# Patient Record
Sex: FEMALE | URBAN - METROPOLITAN AREA
[De-identification: names, ages, dates, MRNs, and addresses within clinical notes are randomized per-mention and may not be internally consistent; named-entity substitution may affect disease eponyms.]

---

## 2021-11-24 ENCOUNTER — OFFICE VISIT (OUTPATIENT)
Dept: URBAN - METROPOLITAN AREA CLINIC 25 | Facility: CLINIC | Age: 71
End: 2021-11-24
Payer: MEDICARE

## 2021-11-24 ENCOUNTER — LAB OUTSIDE AN ENCOUNTER (OUTPATIENT)
Dept: URBAN - METROPOLITAN AREA CLINIC 25 | Facility: CLINIC | Age: 71
End: 2021-11-24

## 2021-11-24 ENCOUNTER — DASHBOARD ENCOUNTERS (OUTPATIENT)
Age: 71
End: 2021-11-24

## 2021-11-24 VITALS
TEMPERATURE: 97.1 F | SYSTOLIC BLOOD PRESSURE: 166 MMHG | HEIGHT: 64 IN | DIASTOLIC BLOOD PRESSURE: 90 MMHG | HEART RATE: 103 BPM | BODY MASS INDEX: 25.81 KG/M2 | WEIGHT: 151.2 LBS

## 2021-11-24 DIAGNOSIS — E11.69 TYPE 2 DIABETES MELLITUS WITH OTHER SPECIFIED COMPLICATION: ICD-10-CM

## 2021-11-24 DIAGNOSIS — Z12.11 COLON CANCER SCREENING: ICD-10-CM

## 2021-11-24 PROBLEM — 12811000119100 COMPLICATION DUE TO DIABETES MELLITUS TYPE 2: Status: ACTIVE | Noted: 2021-11-24

## 2021-11-24 PROCEDURE — 99202 OFFICE O/P NEW SF 15 MIN: CPT | Performed by: INTERNAL MEDICINE

## 2021-11-24 RX ORDER — GLUCOSAMINE SULFATE 500 MG
1 CAPSULE WITH A MEAL CAPSULE ORAL THREE TIMES A DAY
Status: ACTIVE | COMMUNITY

## 2021-11-24 RX ORDER — TURMERIC 100 %
AS DIRECTED POWDER (GRAM) MISCELLANEOUS
Status: ACTIVE | COMMUNITY

## 2021-11-24 RX ORDER — SODIUM, POTASSIUM,MAG SULFATES 17.5-3.13G
1 KIT SOLUTION, RECONSTITUTED, ORAL ORAL
Qty: 1 KIT (354ML) | Refills: 0 | OUTPATIENT
Start: 2021-11-24 | End: 2021-11-25

## 2021-11-24 RX ORDER — LORATADINE 10 MG
1 TABLET TABLET ORAL ONCE A DAY
Status: ACTIVE | COMMUNITY

## 2021-11-24 RX ORDER — METFORMIN HYDROCHLORIDE 1000 MG/1
1 TABLET WITH A MEAL TABLET, FILM COATED ORAL ONCE A DAY
Status: ACTIVE | COMMUNITY

## 2021-11-24 RX ORDER — IRON FUM,PS CMP/VIT C/NIACIN 125-40-3MG
1 CAPSULE BETWEEN MEALS CAPSULE ORAL ONCE A DAY
Status: ACTIVE | COMMUNITY

## 2021-11-24 RX ORDER — LOVASTATIN 10 MG/1
1 TABLET WITH THE EVENING MEAL TABLET ORAL ONCE A DAY
Status: ACTIVE | COMMUNITY

## 2021-11-24 NOTE — PHYSICAL EXAM CARDIOVASCULAR:
Patient resting quietly in no acute distress, respirations even and unlabored, 
IV fluids infusing without difficulty, no redness or swelling noted at site. No 
adverse reaction noted to medication. no edema, no murmurs, regular rate and rhythm

## 2021-12-16 ENCOUNTER — OFFICE VISIT (OUTPATIENT)
Dept: URBAN - METROPOLITAN AREA SURGERY CENTER 20 | Facility: SURGERY CENTER | Age: 71
End: 2021-12-16
Payer: MEDICARE

## 2021-12-16 DIAGNOSIS — D12.0 ADENOMA OF CECUM: ICD-10-CM

## 2021-12-16 DIAGNOSIS — Z12.11 AVERAGE RISK FOR CRC. DUE TO PT'S CO-MORBID STATE WITH END STAGE DEMENTIA, HIGH RISK FOR ANESTHESIA (PER NEUROLOGY); INABILITY TO TAKE A BOWEL PREP....WOULD NOT ADVISE ANY COLORECTAL CANCER SCREENING INCLUDING STOOL TEST FOR FECAL BLOOD.: ICD-10-CM

## 2021-12-16 DIAGNOSIS — K62.1 ANAL AND RECTAL POLYP: ICD-10-CM

## 2021-12-16 PROCEDURE — 45380 COLONOSCOPY AND BIOPSY: CPT | Performed by: INTERNAL MEDICINE

## 2021-12-16 PROCEDURE — G8907 PT DOC NO EVENTS ON DISCHARG: HCPCS | Performed by: INTERNAL MEDICINE

## 2021-12-16 RX ORDER — IRON FUM,PS CMP/VIT C/NIACIN 125-40-3MG
1 CAPSULE BETWEEN MEALS CAPSULE ORAL ONCE A DAY
Status: ACTIVE | COMMUNITY

## 2021-12-16 RX ORDER — METFORMIN HYDROCHLORIDE 1000 MG/1
1 TABLET WITH A MEAL TABLET, FILM COATED ORAL ONCE A DAY
Status: ACTIVE | COMMUNITY

## 2021-12-16 RX ORDER — TURMERIC 100 %
AS DIRECTED POWDER (GRAM) MISCELLANEOUS
Status: ACTIVE | COMMUNITY

## 2021-12-16 RX ORDER — LOVASTATIN 10 MG/1
1 TABLET WITH THE EVENING MEAL TABLET ORAL ONCE A DAY
Status: ACTIVE | COMMUNITY

## 2021-12-16 RX ORDER — LORATADINE 10 MG
1 TABLET TABLET ORAL ONCE A DAY
Status: ACTIVE | COMMUNITY

## 2021-12-16 RX ORDER — GLUCOSAMINE SULFATE 500 MG
1 CAPSULE WITH A MEAL CAPSULE ORAL THREE TIMES A DAY
Status: ACTIVE | COMMUNITY

## 2021-12-30 ENCOUNTER — TELEPHONE ENCOUNTER (OUTPATIENT)
Dept: URBAN - METROPOLITAN AREA CLINIC 92 | Facility: CLINIC | Age: 71
End: 2021-12-30

## 2022-04-30 ENCOUNTER — TELEPHONE ENCOUNTER (OUTPATIENT)
Dept: URBAN - METROPOLITAN AREA CLINIC 121 | Facility: CLINIC | Age: 72
End: 2022-04-30

## 2022-04-30 RX ORDER — VALSARTAN/HYDROCHLOROTHIAZIDE 160-12.5MG
TABLET ORAL
OUTPATIENT
Start: 2009-04-27

## 2022-04-30 RX ORDER — DEXLANSOPRAZOLE 60 MG/1
1 PO QD CAPSULE, DELAYED RELEASE ORAL
OUTPATIENT
Start: 2012-04-26 | End: 2012-11-21

## 2022-04-30 RX ORDER — FERROUS FUMARATE/FOLIC ACID 106 MG-1MG
TABLET ORAL
OUTPATIENT
Start: 2009-04-27 | End: 2012-05-30

## 2022-04-30 RX ORDER — ASPIRIN 81 MG
TABLET, DELAYED RELEASE (ENTERIC COATED) ORAL
OUTPATIENT
Start: 2009-04-27

## 2022-04-30 RX ORDER — OMEPRAZOLE 40 MG/1
TAKE 1 CAPSULE 1HR BEFORE BREAKFAST CAPSULE, DELAYED RELEASE ORAL
OUTPATIENT
Start: 2012-11-26

## 2022-04-30 RX ORDER — OMEPRAZOLE 40 MG/1
TAKE 1 CAPSULE BY MOUTH EVERYDAY CAPSULE, DELAYED RELEASE ORAL
OUTPATIENT
Start: 2013-03-25 | End: 2013-11-11

## 2022-04-30 RX ORDER — OMEPRAZOLE 40 MG/1
TAKE 1 CAPSULE PO QD CAPSULE, DELAYED RELEASE ORAL
OUTPATIENT
Start: 2013-07-19 | End: 2013-11-11

## 2022-04-30 RX ORDER — LOVASTATIN 20 MG/1
TABLET ORAL
OUTPATIENT
Start: 2009-04-27

## 2022-04-30 RX ORDER — PANTOPRAZOLE SODIUM 40 MG/1
TABLET, DELAYED RELEASE ORAL
OUTPATIENT
Start: 2009-04-27 | End: 2012-05-30

## 2022-04-30 RX ORDER — PIOGLITAZONE HCL 30 MG
TABLET ORAL
OUTPATIENT
Start: 2009-04-27 | End: 2012-05-30

## 2022-04-30 RX ORDER — METFORMIN HCL 1000 MG/1
TABLET ORAL
OUTPATIENT
Start: 2009-04-27

## 2022-04-30 RX ORDER — FERROUS FUMARATE/FOLIC ACID 106 MG-1MG
TABLET ORAL
OUTPATIENT
Start: 2009-04-27

## 2022-04-30 RX ORDER — ASPIRIN 81 MG
TABLET, DELAYED RELEASE (ENTERIC COATED) ORAL
OUTPATIENT
Start: 2009-04-27 | End: 2012-05-30

## 2022-04-30 RX ORDER — OMEPRAZOLE 40 MG/1
TAKE 1 CAPSULE PO QD CAPSULE, DELAYED RELEASE ORAL
OUTPATIENT
Start: 2013-07-19

## 2022-04-30 RX ORDER — DEXLANSOPRAZOLE 60 MG/1
1 PO QD CAPSULE, DELAYED RELEASE ORAL
OUTPATIENT
Start: 2012-04-26

## 2022-04-30 RX ORDER — PANTOPRAZOLE SODIUM 40 MG/1
TABLET, DELAYED RELEASE ORAL
OUTPATIENT
Start: 2009-04-27

## 2022-04-30 RX ORDER — OMEPRAZOLE 40 MG/1
TAKE 1 CAPSULE BY MOUTH EVERYDAY CAPSULE, DELAYED RELEASE ORAL
OUTPATIENT
Start: 2013-03-25

## 2022-04-30 RX ORDER — OMEPRAZOLE 40 MG/1
TAKE 1 CAPSULE 1HR BEFORE BREAKFAST CAPSULE, DELAYED RELEASE ORAL
OUTPATIENT
Start: 2012-11-26 | End: 2013-11-11

## 2022-04-30 RX ORDER — PIOGLITAZONE HCL 30 MG
TABLET ORAL
OUTPATIENT
Start: 2009-04-27

## 2022-04-30 RX ORDER — VALSARTAN/HYDROCHLOROTHIAZIDE 160-12.5MG
TABLET ORAL
OUTPATIENT
Start: 2009-04-27 | End: 2012-05-30

## 2022-05-01 ENCOUNTER — TELEPHONE ENCOUNTER (OUTPATIENT)
Dept: URBAN - METROPOLITAN AREA CLINIC 121 | Facility: CLINIC | Age: 72
End: 2022-05-01

## 2022-05-01 RX ORDER — LOSARTAN POTASSIUM 100 MG/1
QD TABLET, FILM COATED ORAL
Status: ACTIVE | COMMUNITY
Start: 2012-05-30

## 2022-05-01 RX ORDER — POLYETHYLENE GLYCOL 3350, SODIUM SULFATE, SODIUM CHLORIDE, POTASSIUM CHLORIDE, ASCORBIC ACID, SODIUM ASCORBATE 7.5-2.691G
MIX AND USE AS DIRECTED KIT ORAL
Status: ACTIVE | COMMUNITY
Start: 2013-10-24

## 2022-05-01 RX ORDER — AMLODIPINE BESYLATE 5 MG/1
QD TABLET ORAL
Status: ACTIVE | COMMUNITY
Start: 2012-05-30

## 2022-05-01 RX ORDER — PSEUDOEPHEDRINE HCL 30 MG
2 QD TABLET ORAL
Status: ACTIVE | COMMUNITY
Start: 2012-11-26

## 2022-05-01 RX ORDER — OMEPRAZOLE 40 MG/1
1 CAPSULE PO BID X 14 DAYS CAPSULE, DELAYED RELEASE ORAL
Status: ACTIVE | COMMUNITY
Start: 2013-11-25

## 2022-05-01 RX ORDER — FERROUS FUMARATE/FOLIC ACID 106 MG-1MG
QD TABLET ORAL
Status: ACTIVE | COMMUNITY
Start: 2012-11-26

## 2022-05-01 RX ORDER — LOVASTATIN 20 MG/1
QD TABLET ORAL
Status: ACTIVE | COMMUNITY
Start: 2012-05-30

## 2022-05-01 RX ORDER — GLUCOSAMINE SULFATE 500 MG
QD CAPSULE ORAL
Status: ACTIVE | COMMUNITY
Start: 2012-11-26

## 2022-05-01 RX ORDER — KRILL/OM-3/DHA/EPA/PHOSPHO/AST 1000-230MG
QD CAPSULE ORAL
Status: ACTIVE | COMMUNITY
Start: 2012-05-30

## 2022-05-01 RX ORDER — METFORMIN HCL 1000 MG/1
BID TABLET ORAL
Status: ACTIVE | COMMUNITY
Start: 2012-05-30

## 2022-05-01 RX ORDER — DIMENHYDRINATE 50 MG
TABLET ORAL
Status: ACTIVE | COMMUNITY
Start: 2012-11-26

## 2022-05-01 RX ORDER — AMLODIPINE BESYLATE 5 MG/1
QD TABLET ORAL
Status: ACTIVE | COMMUNITY
Start: 2012-11-26

## 2022-05-01 RX ORDER — AMOXICILLIN 500 MG/1
2 TABLETS PO BID X 14DAYS TABLET, FILM COATED ORAL
Status: ACTIVE | COMMUNITY
Start: 2013-11-25

## 2022-05-01 RX ORDER — CLARITHROMYCIN 500 MG/1
1 TABLET PO BID X 14 DAYS TABLET ORAL
Status: ACTIVE | COMMUNITY
Start: 2013-11-25

## 2022-05-01 RX ORDER — DEXLANSOPRAZOLE 60 MG/1
1 CAPSULE PO QAM CAPSULE, DELAYED RELEASE ORAL
Status: ACTIVE | COMMUNITY
Start: 2013-10-24

## 2024-02-21 ENCOUNTER — OV NP (OUTPATIENT)
Dept: URBAN - METROPOLITAN AREA CLINIC 27 | Facility: CLINIC | Age: 74
End: 2024-02-21
Payer: COMMERCIAL

## 2024-02-21 VITALS
HEIGHT: 64 IN | SYSTOLIC BLOOD PRESSURE: 182 MMHG | DIASTOLIC BLOOD PRESSURE: 104 MMHG | BODY MASS INDEX: 25.61 KG/M2 | HEART RATE: 107 BPM | WEIGHT: 150 LBS

## 2024-02-21 DIAGNOSIS — R31.29 MICROSCOPIC HEMATURIA: ICD-10-CM

## 2024-02-21 PROBLEM — 197940006: Status: ACTIVE | Noted: 2024-02-21

## 2024-02-21 PROCEDURE — 99203 OFFICE O/P NEW LOW 30 MIN: CPT | Performed by: INTERNAL MEDICINE

## 2024-02-21 RX ORDER — TURMERIC 100 %
AS DIRECTED POWDER (GRAM) MISCELLANEOUS
Status: ACTIVE | COMMUNITY

## 2024-02-21 RX ORDER — LOVASTATIN 20 MG/1
QD TABLET ORAL
Status: ACTIVE | COMMUNITY
Start: 2012-05-30

## 2024-02-21 RX ORDER — FERROUS FUMARATE/FOLIC ACID 106 MG-1MG
QD TABLET ORAL
Status: ACTIVE | COMMUNITY
Start: 2012-11-26

## 2024-02-21 RX ORDER — KRILL/OM-3/DHA/EPA/PHOSPHO/AST 1000-230MG
QD CAPSULE ORAL
Status: ACTIVE | COMMUNITY
Start: 2012-05-30

## 2024-02-21 RX ORDER — PSEUDOEPHEDRINE HCL 30 MG
2 QD TABLET ORAL
Status: ACTIVE | COMMUNITY
Start: 2012-11-26

## 2024-02-21 RX ORDER — GLUCOSAMINE SULFATE 500 MG
QD CAPSULE ORAL
Status: ACTIVE | COMMUNITY
Start: 2012-11-26

## 2024-02-21 RX ORDER — DIMENHYDRINATE 50 MG
TABLET ORAL
Status: ACTIVE | COMMUNITY
Start: 2012-11-26

## 2024-02-21 RX ORDER — LOSARTAN POTASSIUM 100 MG/1
QD TABLET, FILM COATED ORAL
Status: ACTIVE | COMMUNITY
Start: 2012-05-30

## 2024-02-21 RX ORDER — IRON FUM,PS CMP/VIT C/NIACIN 125-40-3MG
1 CAPSULE BETWEEN MEALS CAPSULE ORAL ONCE A DAY
Status: ACTIVE | COMMUNITY

## 2024-02-21 RX ORDER — AMLODIPINE BESYLATE 5 MG/1
QD TABLET ORAL
Status: ACTIVE | COMMUNITY
Start: 2012-11-26

## 2024-02-21 RX ORDER — LOVASTATIN 10 MG/1
1 TABLET WITH THE EVENING MEAL TABLET ORAL ONCE A DAY
Status: ACTIVE | COMMUNITY

## 2024-02-21 RX ORDER — AMOXICILLIN 500 MG/1
2 TABLETS PO BID X 14DAYS TABLET, FILM COATED ORAL
Status: ACTIVE | COMMUNITY
Start: 2013-11-25

## 2024-02-21 RX ORDER — OMEPRAZOLE 40 MG/1
1 CAPSULE PO BID X 14 DAYS CAPSULE, DELAYED RELEASE ORAL
Status: ACTIVE | COMMUNITY
Start: 2013-11-25

## 2024-02-21 RX ORDER — POLYETHYLENE GLYCOL 3350, SODIUM SULFATE, SODIUM CHLORIDE, POTASSIUM CHLORIDE, ASCORBIC ACID, SODIUM ASCORBATE 7.5-2.691G
MIX AND USE AS DIRECTED KIT ORAL
Status: ACTIVE | COMMUNITY
Start: 2013-10-24

## 2024-02-21 RX ORDER — METFORMIN HCL 1000 MG/1
BID TABLET ORAL
Status: ACTIVE | COMMUNITY
Start: 2012-05-30

## 2024-02-21 RX ORDER — METFORMIN HYDROCHLORIDE 1000 MG/1
1 TABLET WITH A MEAL TABLET, FILM COATED ORAL ONCE A DAY
Status: ACTIVE | COMMUNITY

## 2024-02-21 RX ORDER — CLARITHROMYCIN 500 MG/1
1 TABLET PO BID X 14 DAYS TABLET ORAL
Status: ACTIVE | COMMUNITY
Start: 2013-11-25

## 2024-02-21 RX ORDER — LORATADINE 10 MG
1 TABLET TABLET ORAL ONCE A DAY
Status: ACTIVE | COMMUNITY

## 2024-02-21 RX ORDER — DEXLANSOPRAZOLE 60 MG/1
1 CAPSULE PO QAM CAPSULE, DELAYED RELEASE ORAL
Status: ACTIVE | COMMUNITY
Start: 2013-10-24

## 2024-02-21 RX ORDER — GLUCOSAMINE SULFATE 500 MG
1 CAPSULE WITH A MEAL CAPSULE ORAL THREE TIMES A DAY
Status: ACTIVE | COMMUNITY

## 2024-02-21 NOTE — HPI-TODAY'S VISIT:
Mrs. Dickens is a 74-year-old -American female who presents today per referral by her primary care physician and oncologist for evaluation of hematuria.  I specifically queried the patient whether she was referred for blood in stool and she denies.  She denies seeing any blood in her stool.  Specifically, she was told she had blood in her urine.  She actually has not seen the blood in her urine. She had a colonoscopy in 2021 which was notable for polyps with Dr. Rascon.  Otherwise, she has no other GI complaints.  Her history is notable for chronic anemia, hypertension and diabetes mellitus.

## 2024-03-04 ENCOUNTER — TELEP (OUTPATIENT)
Dept: URBAN - METROPOLITAN AREA TELEHEALTH 2 | Facility: TELEHEALTH | Age: 74
End: 2024-03-04
Payer: COMMERCIAL

## 2024-03-04 DIAGNOSIS — R10.32 LEFT LOWER QUADRANT PAIN: ICD-10-CM

## 2024-03-04 PROCEDURE — 99443 PHONE E/M BY PHYS 21-30 MIN: CPT | Performed by: INTERNAL MEDICINE

## 2024-03-04 RX ORDER — LORATADINE 10 MG
1 TABLET TABLET ORAL ONCE A DAY
Status: ACTIVE | COMMUNITY

## 2024-03-04 RX ORDER — TURMERIC 100 %
AS DIRECTED POWDER (GRAM) MISCELLANEOUS
Status: ACTIVE | COMMUNITY

## 2024-03-04 RX ORDER — FERROUS FUMARATE/FOLIC ACID 106 MG-1MG
QD TABLET ORAL
Status: ACTIVE | COMMUNITY
Start: 2012-11-26

## 2024-03-04 RX ORDER — LOSARTAN POTASSIUM 100 MG/1
QD TABLET, FILM COATED ORAL
Status: ACTIVE | COMMUNITY
Start: 2012-05-30

## 2024-03-04 RX ORDER — POLYETHYLENE GLYCOL 3350, SODIUM SULFATE, SODIUM CHLORIDE, POTASSIUM CHLORIDE, ASCORBIC ACID, SODIUM ASCORBATE 7.5-2.691G
MIX AND USE AS DIRECTED KIT ORAL
Status: ACTIVE | COMMUNITY
Start: 2013-10-24

## 2024-03-04 RX ORDER — DICYCLOMINE HYDROCHLORIDE 10 MG/1
1 CAPSULE 30 MINUTES BEFORE EATING CAPSULE ORAL THREE TIMES A DAY
Qty: 90 | Refills: 3 | OUTPATIENT
Start: 2024-03-06 | End: 2024-07-04

## 2024-03-04 RX ORDER — DIMENHYDRINATE 50 MG
TABLET ORAL
Status: ACTIVE | COMMUNITY
Start: 2012-11-26

## 2024-03-04 RX ORDER — OMEPRAZOLE 40 MG/1
1 CAPSULE PO BID X 14 DAYS CAPSULE, DELAYED RELEASE ORAL
Status: ACTIVE | COMMUNITY
Start: 2013-11-25

## 2024-03-04 RX ORDER — KRILL/OM-3/DHA/EPA/PHOSPHO/AST 1000-230MG
QD CAPSULE ORAL
Status: ACTIVE | COMMUNITY
Start: 2012-05-30

## 2024-03-04 RX ORDER — GLUCOSAMINE SULFATE 500 MG
QD CAPSULE ORAL
Status: ACTIVE | COMMUNITY
Start: 2012-11-26

## 2024-03-04 RX ORDER — AMLODIPINE BESYLATE 5 MG/1
QD TABLET ORAL
Status: ACTIVE | COMMUNITY
Start: 2012-11-26

## 2024-03-04 RX ORDER — METFORMIN HCL 1000 MG/1
BID TABLET ORAL
Status: ACTIVE | COMMUNITY
Start: 2012-05-30

## 2024-03-04 RX ORDER — DEXLANSOPRAZOLE 60 MG/1
1 CAPSULE PO QAM CAPSULE, DELAYED RELEASE ORAL
Status: ACTIVE | COMMUNITY
Start: 2013-10-24

## 2024-03-04 RX ORDER — LOVASTATIN 10 MG/1
1 TABLET WITH THE EVENING MEAL TABLET ORAL ONCE A DAY
Status: ACTIVE | COMMUNITY

## 2024-03-04 RX ORDER — GLUCOSAMINE SULFATE 500 MG
1 CAPSULE WITH A MEAL CAPSULE ORAL THREE TIMES A DAY
Status: ACTIVE | COMMUNITY

## 2024-03-04 RX ORDER — AMOXICILLIN 500 MG/1
2 TABLETS PO BID X 14DAYS TABLET, FILM COATED ORAL
Status: ACTIVE | COMMUNITY
Start: 2013-11-25

## 2024-03-04 RX ORDER — METFORMIN HYDROCHLORIDE 1000 MG/1
1 TABLET WITH A MEAL TABLET, FILM COATED ORAL ONCE A DAY
Status: ACTIVE | COMMUNITY

## 2024-03-04 RX ORDER — LOVASTATIN 20 MG/1
QD TABLET ORAL
Status: ACTIVE | COMMUNITY
Start: 2012-05-30

## 2024-03-04 RX ORDER — PSEUDOEPHEDRINE HCL 30 MG
2 QD TABLET ORAL
Status: ACTIVE | COMMUNITY
Start: 2012-11-26

## 2024-03-04 RX ORDER — IRON FUM,PS CMP/VIT C/NIACIN 125-40-3MG
1 CAPSULE BETWEEN MEALS CAPSULE ORAL ONCE A DAY
Status: ACTIVE | COMMUNITY

## 2024-03-04 RX ORDER — CLARITHROMYCIN 500 MG/1
1 TABLET PO BID X 14 DAYS TABLET ORAL
Status: ACTIVE | COMMUNITY
Start: 2013-11-25

## 2024-03-04 NOTE — HPI-TODAY'S VISIT:
Mrs. Dickens calls in today with complaint of left lower quadrant discomfort that she has been experiencing intermittently for several months.  She also reports some mild constipation.  Her last colonoscopy in December 2021 was notable for a couple polyps and internal hemorrhoids.  Otherwise, she has no other GI complaints.

## 2024-03-06 ENCOUNTER — LAB (OUTPATIENT)
Dept: URBAN - METROPOLITAN AREA TELEHEALTH 2 | Facility: TELEHEALTH | Age: 74
End: 2024-03-06

## 2024-03-06 PROBLEM — 301716002: Status: ACTIVE | Noted: 2024-03-06

## 2024-04-11 ENCOUNTER — TELEP (OUTPATIENT)
Dept: URBAN - METROPOLITAN AREA CLINIC 29 | Facility: CLINIC | Age: 74
End: 2024-04-11
Payer: COMMERCIAL

## 2024-04-11 DIAGNOSIS — R10.32 LEFT LOWER QUADRANT PAIN: ICD-10-CM

## 2024-04-11 PROCEDURE — 99443 PHONE E/M BY PHYS 21-30 MIN: CPT | Performed by: INTERNAL MEDICINE

## 2024-04-11 RX ORDER — POLYETHYLENE GLYCOL 3350, SODIUM SULFATE, SODIUM CHLORIDE, POTASSIUM CHLORIDE, ASCORBIC ACID, SODIUM ASCORBATE 7.5-2.691G
MIX AND USE AS DIRECTED KIT ORAL
Status: ACTIVE | COMMUNITY
Start: 2013-10-24

## 2024-04-11 RX ORDER — LOVASTATIN 20 MG/1
QD TABLET ORAL
Status: ACTIVE | COMMUNITY
Start: 2012-05-30

## 2024-04-11 RX ORDER — AMLODIPINE BESYLATE 5 MG/1
QD TABLET ORAL
Status: ACTIVE | COMMUNITY
Start: 2012-11-26

## 2024-04-11 RX ORDER — LOSARTAN POTASSIUM 100 MG/1
QD TABLET, FILM COATED ORAL
Status: ACTIVE | COMMUNITY
Start: 2012-05-30

## 2024-04-11 RX ORDER — KRILL/OM-3/DHA/EPA/PHOSPHO/AST 1000-230MG
QD CAPSULE ORAL
Status: ACTIVE | COMMUNITY
Start: 2012-05-30

## 2024-04-11 RX ORDER — METFORMIN HCL 1000 MG/1
BID TABLET ORAL
Status: ACTIVE | COMMUNITY
Start: 2012-05-30

## 2024-04-11 RX ORDER — GLUCOSAMINE SULFATE 500 MG
QD CAPSULE ORAL
Status: ACTIVE | COMMUNITY
Start: 2012-11-26

## 2024-04-11 RX ORDER — FERROUS FUMARATE/FOLIC ACID 106 MG-1MG
QD TABLET ORAL
Status: ACTIVE | COMMUNITY
Start: 2012-11-26

## 2024-04-11 RX ORDER — IRON FUM,PS CMP/VIT C/NIACIN 125-40-3MG
1 CAPSULE BETWEEN MEALS CAPSULE ORAL ONCE A DAY
Status: ACTIVE | COMMUNITY

## 2024-04-11 RX ORDER — OMEPRAZOLE 40 MG/1
1 CAPSULE PO BID X 14 DAYS CAPSULE, DELAYED RELEASE ORAL
Status: ACTIVE | COMMUNITY
Start: 2013-11-25

## 2024-04-11 RX ORDER — CLARITHROMYCIN 500 MG/1
1 TABLET PO BID X 14 DAYS TABLET ORAL
Status: ACTIVE | COMMUNITY
Start: 2013-11-25

## 2024-04-11 RX ORDER — PSEUDOEPHEDRINE HCL 30 MG
2 QD TABLET ORAL
Status: ACTIVE | COMMUNITY
Start: 2012-11-26

## 2024-04-11 RX ORDER — AMOXICILLIN 500 MG/1
2 TABLETS PO BID X 14DAYS TABLET, FILM COATED ORAL
Status: ACTIVE | COMMUNITY
Start: 2013-11-25

## 2024-04-11 RX ORDER — METFORMIN HYDROCHLORIDE 1000 MG/1
1 TABLET WITH A MEAL TABLET, FILM COATED ORAL ONCE A DAY
Status: ACTIVE | COMMUNITY

## 2024-04-11 RX ORDER — DIMENHYDRINATE 50 MG
TABLET ORAL
Status: ACTIVE | COMMUNITY
Start: 2012-11-26

## 2024-04-11 RX ORDER — LOVASTATIN 10 MG/1
1 TABLET WITH THE EVENING MEAL TABLET ORAL ONCE A DAY
Status: ACTIVE | COMMUNITY

## 2024-04-11 RX ORDER — DEXLANSOPRAZOLE 60 MG/1
1 CAPSULE PO QAM CAPSULE, DELAYED RELEASE ORAL
Status: ACTIVE | COMMUNITY
Start: 2013-10-24

## 2024-04-11 RX ORDER — TURMERIC 100 %
AS DIRECTED POWDER (GRAM) MISCELLANEOUS
Status: ACTIVE | COMMUNITY

## 2024-04-11 RX ORDER — DICYCLOMINE HYDROCHLORIDE 10 MG/1
1 CAPSULE 30 MINUTES BEFORE EATING CAPSULE ORAL THREE TIMES A DAY
Qty: 90 | Refills: 3 | Status: ACTIVE | COMMUNITY
Start: 2024-03-06 | End: 2024-07-04

## 2024-04-11 RX ORDER — GLUCOSAMINE SULFATE 500 MG
1 CAPSULE WITH A MEAL CAPSULE ORAL THREE TIMES A DAY
Status: ACTIVE | COMMUNITY

## 2024-04-11 RX ORDER — LORATADINE 10 MG
1 TABLET TABLET ORAL ONCE A DAY
Status: ACTIVE | COMMUNITY

## 2024-04-11 NOTE — HPI-TODAY'S VISIT:
Mrs. Dickens calls and today for telephone visit follow-up of her abdominal pain.  During her last visit, I advised her to start taking a fiber supplement such as MiraLAX and I will also prescribe dicyclomine for her to take as directed.  I will also order a CT scan of the abdomen for further evaluation.  Today, the patient reports that she is still having intermittent diffuse abdominal discomfort.  She is unsure what the cause is.  She reports that she takes Metamucil every few days because taking it daily causes loose stools.  She stopped taking dicyclomine because she said that the medication did not make her feel well.  Otherwise, she has no other GI complaints.

## 2024-05-23 ENCOUNTER — OFFICE VISIT (OUTPATIENT)
Dept: URBAN - METROPOLITAN AREA TELEHEALTH 2 | Facility: TELEHEALTH | Age: 74
End: 2024-05-23
Payer: COMMERCIAL

## 2024-05-23 ENCOUNTER — LAB OUTSIDE AN ENCOUNTER (OUTPATIENT)
Dept: URBAN - METROPOLITAN AREA TELEHEALTH 2 | Facility: TELEHEALTH | Age: 74
End: 2024-05-23

## 2024-05-23 DIAGNOSIS — R10.32 LEFT LOWER QUADRANT PAIN: ICD-10-CM

## 2024-05-23 DIAGNOSIS — I10 ESSENTIAL (PRIMARY) HYPERTENSION: ICD-10-CM

## 2024-05-23 DIAGNOSIS — R13.19 ESOPHAGEAL DYSPHAGIA: ICD-10-CM

## 2024-05-23 DIAGNOSIS — K21.9 GASTRO-ESOPHAGEAL REFLUX DISEASE WITHOUT ESOPHAGITIS: ICD-10-CM

## 2024-05-23 PROBLEM — 313436004: Status: ACTIVE | Noted: 2024-05-23

## 2024-05-23 PROBLEM — 399153001: Status: ACTIVE | Noted: 2024-05-23

## 2024-05-23 PROBLEM — 40890009: Status: ACTIVE | Noted: 2024-05-23

## 2024-05-23 PROCEDURE — 99443 PHONE E/M BY PHYS 21-30 MIN: CPT | Performed by: INTERNAL MEDICINE

## 2024-05-23 RX ORDER — OMEPRAZOLE 40 MG/1
1 CAPSULE PO BID X 14 DAYS CAPSULE, DELAYED RELEASE ORAL
Status: ACTIVE | COMMUNITY
Start: 2013-11-25

## 2024-05-23 RX ORDER — GLUCOSAMINE SULFATE 500 MG
QD CAPSULE ORAL
Status: ACTIVE | COMMUNITY
Start: 2012-11-26

## 2024-05-23 RX ORDER — AMLODIPINE BESYLATE 5 MG/1
QD TABLET ORAL
Status: ACTIVE | COMMUNITY
Start: 2012-11-26

## 2024-05-23 RX ORDER — LOSARTAN POTASSIUM 100 MG/1
QD TABLET, FILM COATED ORAL
Status: ACTIVE | COMMUNITY
Start: 2012-05-30

## 2024-05-23 RX ORDER — IRON FUM,PS CMP/VIT C/NIACIN 125-40-3MG
1 CAPSULE BETWEEN MEALS CAPSULE ORAL ONCE A DAY
Status: ACTIVE | COMMUNITY

## 2024-05-23 RX ORDER — GLUCOSAMINE SULFATE 500 MG
1 CAPSULE WITH A MEAL CAPSULE ORAL THREE TIMES A DAY
Status: ACTIVE | COMMUNITY

## 2024-05-23 RX ORDER — DEXLANSOPRAZOLE 60 MG/1
1 CAPSULE PO QAM CAPSULE, DELAYED RELEASE ORAL
Status: ACTIVE | COMMUNITY
Start: 2013-10-24

## 2024-05-23 RX ORDER — KRILL/OM-3/DHA/EPA/PHOSPHO/AST 1000-230MG
QD CAPSULE ORAL
Status: ACTIVE | COMMUNITY
Start: 2012-05-30

## 2024-05-23 RX ORDER — LOVASTATIN 20 MG/1
QD TABLET ORAL
Status: ACTIVE | COMMUNITY
Start: 2012-05-30

## 2024-05-23 RX ORDER — DICYCLOMINE HYDROCHLORIDE 10 MG/1
1 CAPSULE 30 MINUTES BEFORE EATING CAPSULE ORAL THREE TIMES A DAY
Qty: 90 | Refills: 3 | Status: ACTIVE | COMMUNITY
Start: 2024-03-06 | End: 2024-07-04

## 2024-05-23 RX ORDER — PSEUDOEPHEDRINE HCL 30 MG
2 QD TABLET ORAL
Status: ACTIVE | COMMUNITY
Start: 2012-11-26

## 2024-05-23 RX ORDER — TURMERIC 100 %
AS DIRECTED POWDER (GRAM) MISCELLANEOUS
Status: ACTIVE | COMMUNITY

## 2024-05-23 RX ORDER — METFORMIN HYDROCHLORIDE 1000 MG/1
1 TABLET WITH A MEAL TABLET, FILM COATED ORAL ONCE A DAY
Status: ACTIVE | COMMUNITY

## 2024-05-23 RX ORDER — AMOXICILLIN 500 MG/1
2 TABLETS PO BID X 14DAYS TABLET, FILM COATED ORAL
Status: ACTIVE | COMMUNITY
Start: 2013-11-25

## 2024-05-23 RX ORDER — LOVASTATIN 10 MG/1
1 TABLET WITH THE EVENING MEAL TABLET ORAL ONCE A DAY
Status: ACTIVE | COMMUNITY

## 2024-05-23 RX ORDER — CLARITHROMYCIN 500 MG/1
1 TABLET PO BID X 14 DAYS TABLET ORAL
Status: ACTIVE | COMMUNITY
Start: 2013-11-25

## 2024-05-23 RX ORDER — FERROUS FUMARATE/FOLIC ACID 106 MG-1MG
QD TABLET ORAL
Status: ACTIVE | COMMUNITY
Start: 2012-11-26

## 2024-05-23 RX ORDER — LORATADINE 10 MG
1 TABLET TABLET ORAL ONCE A DAY
Status: ACTIVE | COMMUNITY

## 2024-05-23 RX ORDER — POLYETHYLENE GLYCOL 3350, SODIUM SULFATE, SODIUM CHLORIDE, POTASSIUM CHLORIDE, ASCORBIC ACID, SODIUM ASCORBATE 7.5-2.691G
MIX AND USE AS DIRECTED KIT ORAL
Status: ACTIVE | COMMUNITY
Start: 2013-10-24

## 2024-05-23 RX ORDER — PANTOPRAZOLE SODIUM 40 MG/1
1 TABLET TABLET, DELAYED RELEASE ORAL ONCE A DAY
Qty: 60 TABLET | Refills: 5 | OUTPATIENT
Start: 2024-05-23

## 2024-05-23 RX ORDER — DIMENHYDRINATE 50 MG
TABLET ORAL
Status: ACTIVE | COMMUNITY
Start: 2012-11-26

## 2024-05-23 RX ORDER — METFORMIN HCL 1000 MG/1
BID TABLET ORAL
Status: ACTIVE | COMMUNITY
Start: 2012-05-30

## 2024-05-29 ENCOUNTER — OFFICE VISIT (OUTPATIENT)
Dept: URBAN - METROPOLITAN AREA SURGERY CENTER 7 | Facility: SURGERY CENTER | Age: 74
End: 2024-05-29

## 2024-07-01 ENCOUNTER — OFFICE VISIT (OUTPATIENT)
Dept: URBAN - METROPOLITAN AREA CLINIC 27 | Facility: CLINIC | Age: 74
End: 2024-07-01

## 2024-08-23 ENCOUNTER — OFFICE VISIT (OUTPATIENT)
Dept: URBAN - METROPOLITAN AREA TELEHEALTH 2 | Facility: TELEHEALTH | Age: 74
End: 2024-08-23
Payer: COMMERCIAL

## 2024-08-23 DIAGNOSIS — R10.32 LEFT LOWER QUADRANT PAIN: ICD-10-CM

## 2024-08-23 PROCEDURE — 99443 PHONE E/M BY PHYS 21-30 MIN: CPT | Performed by: INTERNAL MEDICINE

## 2024-08-23 RX ORDER — TURMERIC 100 %
AS DIRECTED POWDER (GRAM) MISCELLANEOUS
Status: ACTIVE | COMMUNITY

## 2024-08-23 RX ORDER — AMLODIPINE BESYLATE 5 MG/1
QD TABLET ORAL
Status: ACTIVE | COMMUNITY
Start: 2012-11-26

## 2024-08-23 RX ORDER — GLUCOSAMINE SULFATE 500 MG
1 CAPSULE WITH A MEAL CAPSULE ORAL THREE TIMES A DAY
Status: ACTIVE | COMMUNITY

## 2024-08-23 RX ORDER — LOVASTATIN 20 MG/1
QD TABLET ORAL
Status: ACTIVE | COMMUNITY
Start: 2012-05-30

## 2024-08-23 RX ORDER — METFORMIN HCL 1000 MG/1
BID TABLET ORAL
Status: ACTIVE | COMMUNITY
Start: 2012-05-30

## 2024-08-23 RX ORDER — DEXLANSOPRAZOLE 60 MG/1
1 CAPSULE PO QAM CAPSULE, DELAYED RELEASE ORAL
Status: ACTIVE | COMMUNITY
Start: 2013-10-24

## 2024-08-23 RX ORDER — KRILL/OM-3/DHA/EPA/PHOSPHO/AST 1000-230MG
QD CAPSULE ORAL
Status: ACTIVE | COMMUNITY
Start: 2012-05-30

## 2024-08-23 RX ORDER — PANTOPRAZOLE SODIUM 40 MG/1
1 TABLET TABLET, DELAYED RELEASE ORAL ONCE A DAY
Qty: 60 TABLET | Refills: 5 | Status: ACTIVE | COMMUNITY
Start: 2024-05-23

## 2024-08-23 RX ORDER — LOVASTATIN 10 MG/1
1 TABLET WITH THE EVENING MEAL TABLET ORAL ONCE A DAY
Status: ACTIVE | COMMUNITY

## 2024-08-23 RX ORDER — PSEUDOEPHEDRINE HCL 30 MG
2 QD TABLET ORAL
Status: ACTIVE | COMMUNITY
Start: 2012-11-26

## 2024-08-23 RX ORDER — IRON FUM,PS CMP/VIT C/NIACIN 125-40-3MG
1 CAPSULE BETWEEN MEALS CAPSULE ORAL ONCE A DAY
Status: ACTIVE | COMMUNITY

## 2024-08-23 RX ORDER — LOSARTAN POTASSIUM 100 MG/1
QD TABLET, FILM COATED ORAL
Status: ACTIVE | COMMUNITY
Start: 2012-05-30

## 2024-08-23 RX ORDER — METFORMIN HYDROCHLORIDE 1000 MG/1
1 TABLET WITH A MEAL TABLET, FILM COATED ORAL ONCE A DAY
Status: ACTIVE | COMMUNITY

## 2024-08-23 RX ORDER — AMOXICILLIN 500 MG/1
2 TABLETS PO BID X 14DAYS TABLET, FILM COATED ORAL
Status: ACTIVE | COMMUNITY
Start: 2013-11-25

## 2024-08-23 RX ORDER — CLARITHROMYCIN 500 MG/1
1 TABLET PO BID X 14 DAYS TABLET ORAL
Status: ACTIVE | COMMUNITY
Start: 2013-11-25

## 2024-08-23 RX ORDER — POLYETHYLENE GLYCOL 3350, SODIUM SULFATE, SODIUM CHLORIDE, POTASSIUM CHLORIDE, ASCORBIC ACID, SODIUM ASCORBATE 7.5-2.691G
MIX AND USE AS DIRECTED KIT ORAL
Status: ACTIVE | COMMUNITY
Start: 2013-10-24

## 2024-08-23 RX ORDER — FERROUS FUMARATE/FOLIC ACID 106 MG-1MG
QD TABLET ORAL
Status: ACTIVE | COMMUNITY
Start: 2012-11-26

## 2024-08-23 RX ORDER — LORATADINE 10 MG
1 TABLET TABLET ORAL ONCE A DAY
Status: ACTIVE | COMMUNITY

## 2024-08-23 RX ORDER — OMEPRAZOLE 40 MG/1
1 CAPSULE PO BID X 14 DAYS CAPSULE, DELAYED RELEASE ORAL
Status: ACTIVE | COMMUNITY
Start: 2013-11-25

## 2024-08-23 RX ORDER — GLUCOSAMINE SULFATE 500 MG
QD CAPSULE ORAL
Status: ACTIVE | COMMUNITY
Start: 2012-11-26

## 2024-08-23 RX ORDER — DIMENHYDRINATE 50 MG
TABLET ORAL
Status: ACTIVE | COMMUNITY
Start: 2012-11-26

## 2024-08-23 NOTE — HPI-TODAY'S VISIT:
Mr. Dickens calls in today for follow-up for left lower quadrant discomfort.  During her last visit, I advised her to start using IB Francisco for functional abdominal pain.  However, she reports start using a supplement called pepper maría root.  She reports significant improvement of her discomfort.  She is now having postop discomfort now because she is status post total abdominal hysterectomy on August 6, 2024.  Otherwise, she has no other GI complaints.